# Patient Record
Sex: MALE | Race: AMERICAN INDIAN OR ALASKA NATIVE | URBAN - METROPOLITAN AREA
[De-identification: names, ages, dates, MRNs, and addresses within clinical notes are randomized per-mention and may not be internally consistent; named-entity substitution may affect disease eponyms.]

---
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
## 2024-07-02 PROBLEM — Z00.00 ENCOUNTER FOR PREVENTIVE HEALTH EXAMINATION: Status: ACTIVE | Noted: 2024-07-02

## 2024-09-22 ENCOUNTER — EMERGENCY (EMERGENCY)
Facility: HOSPITAL | Age: 24
LOS: 0 days | Discharge: HOME | End: 2024-09-22
Attending: STUDENT IN AN ORGANIZED HEALTH CARE EDUCATION/TRAINING PROGRAM
Payer: COMMERCIAL

## 2024-09-22 VITALS
HEIGHT: 68 IN | DIASTOLIC BLOOD PRESSURE: 94 MMHG | WEIGHT: 232.37 LBS | RESPIRATION RATE: 18 BRPM | OXYGEN SATURATION: 99 % | TEMPERATURE: 98 F | HEART RATE: 106 BPM | SYSTOLIC BLOOD PRESSURE: 158 MMHG

## 2024-09-22 DIAGNOSIS — G51.0 BELL'S PALSY: ICD-10-CM

## 2024-09-22 DIAGNOSIS — J45.909 UNSPECIFIED ASTHMA, UNCOMPLICATED: ICD-10-CM

## 2024-09-22 PROCEDURE — 99283 EMERGENCY DEPT VISIT LOW MDM: CPT | Mod: 25

## 2024-09-22 PROCEDURE — 93010 ELECTROCARDIOGRAM REPORT: CPT

## 2024-09-22 PROCEDURE — 93005 ELECTROCARDIOGRAM TRACING: CPT

## 2024-09-22 PROCEDURE — 99284 EMERGENCY DEPT VISIT MOD MDM: CPT

## 2024-09-22 RX ORDER — PREDNISONE 10 MG
3 TABLET, DOSE PACK ORAL
Qty: 21 | Refills: 0
Start: 2024-09-22 | End: 2024-09-28

## 2024-09-22 RX ORDER — VALACYCLOVIR 500 MG/1
1 TABLET, FILM COATED ORAL
Qty: 21 | Refills: 0
Start: 2024-09-22 | End: 2024-09-28

## 2024-09-22 NOTE — ED PROVIDER NOTE - PATIENT PORTAL LINK FT
You can access the FollowMyHealth Patient Portal offered by [facility] by registering at the following website: [email or web address]. By joining [employer]’s FollowMyHealth portal, you will also be able to view your health information using other applications (apps) compatible with our system.

## 2024-09-22 NOTE — ED ADULT TRIAGE NOTE - CHIEF COMPLAINT QUOTE
Pt reports right sided facial droop and slurred speech starting yesterday around 2pm with associated chest pain. Hx of asthma

## 2024-09-22 NOTE — ED PROVIDER NOTE - OBJECTIVE STATEMENT
22 yo M with hx of asthma who presents with R facial numbness since yesterday. Pt unable to fully close R eye or mouth. Yesterday had cp but none currently. No fever, headache, blurry vision, slurred speech, unilateral weakness, difficulty walking/swallowing, sob, nausea, vomiting, leg swelling/pain, hx of clots, hormone use, recent immobilization/surg, malignancy, hemoptysis.    No PMD

## 2024-09-22 NOTE — ED PROVIDER NOTE - NSFOLLOWUPINSTRUCTIONS_ED_ALL_ED_FT
Cas[place] Palsy    WHAT YOU NEED TO KNOW:    What is Bell palsy? Bell palsy is a sudden weakness or paralysis of one side of your face. Bell palsy occurs when the nerve that controls the muscles in your face becomes swollen or irritated.     What causes Bell palsy? It is not known for sure what causes the facial nerve swelling that leads to Bell palsy. The herpes simplex virus may cause nerve swelling. Viruses that cause colds and the flu may increase your risk for Bell palsy. Your risk is also higher if you are pregnant or have diabetes.    What are the signs and symptoms of Bell palsy? You may notice pain around an ear 1 or 2 days before you have other symptoms. A few days later, weakness may appear on the same side of your face as the ear pain. The symptoms often increase over the following several days. Bell palsy usually lasts about 2 to 3 weeks, but it can last several months. You may have any of the following:   •Weakness or paralysis on one side of your face  •Drooping of one side of your face  •Trouble closing your eye on the affected side of your face  •More sensitive hearing on the affected side  •Numbness or pain in your ear, tongue, or face  •A decrease in the amount of tears and saliva you make  •No ability to taste with the front part of your tongue  •Problems chewing food     How is Bell palsy diagnosed? Your healthcare provider will test how well you can move different parts of your face. He may also do an electromyography (EMG). This test measures the electrical activity of your muscles. An EMG also tests the nerves that control muscles.     How is Bell palsy treated? Bell palsy often goes away without treatment. Some treatments may help you get better faster or help prevent other problems caused by Bell palsy.   •Eye care may be needed if you have problems blinking or closing your eye. Use artificial tears during the day to keep your eye moist. Your healthcare provider may ask that you use an eye ointment at night. You may also need to wear a patch over your eye and tape it shut while you sleep. This helps to keep your eye from getting dry and infected. Wear sunglasses to protect your eye from direct sunlight. Stay away from places that have fumes, dust, or other particles in the air that may harm your eye.    •Medicine may be given to decrease swelling and irritation of your facial nerve. You may receive antiviral medicine if your healthcare provider thinks a virus caused your Bell palsy. Your healthcare provider may also suggest acetaminophen or ibuprofen to reduce pain. These medicines are available without a doctor's order. Ask which medicine to take, and how much you need. Follow directions. Acetaminophen can cause liver damage, and ibuprofen can cause stomach bleeding or kidney damage.     •Physical therapy may be recommended by your healthcare provider. A physical therapist can teach you how to massage your face and exercise the nerves and muscles in your face. This may help you get better sooner and prevent long-term problems. You can exercise on your own when your facial movement begins to return. Open and close your eye, wink, and smile wide. Do the exercises for 15 or 20 minutes several times a day.    When should I contact my healthcare provider?   •You have a fever.  •Your eye becomes red, irritated, or painful.  •You have questions or concerns about your condition or care.    When should I seek immediate care or call 911?   •You develop weakness or numbness on one side of your body (other than your face).  •You have double vision, or you lose vision in your eye.  •You have trouble thinking clearly.    CARE AGREEMENT:    You have the right to help plan your care. Learn about your health condition and how it may be treated. Discuss treatment options with your healthcare providers to decide what care you want to receive. You always have the right to refuse treatment.

## 2024-09-22 NOTE — ED PROVIDER NOTE - PHYSICAL EXAMINATION
CONSTITUTIONAL: well developed, in no acute distress, speaking in full sentences, nontoxic appearing  SKIN: warm, dry, no rash  HEAD: normocephalic, atraumatic  EYES: PERRL at 3mm, EOMI, no conjunctival erythema, no spontaneous nystagmus, no provoked nystagmus  ENT: patent airway, moist mucous membranes, no tongue deviation  NECK: supple, no masses, full flexion/extension without pain  CV:  regular rate, regular rhythm, 2+ radial pulses bilaterally  RESP: no wheezes, no rales, no rhonchi, normal work of breathing  ABD: soft, no tenderness, nondistended, no rebound, no guarding  MSK: normal ROM, no cyanosis, no edema  NEURO: alert, oriented, R facial palsy including R forehead, sensation intact to light touch symmetrically, 5/5 motor strength in all extremities, no pronator drift, normal gait   PSYCH: cooperative, appropriate

## 2024-09-22 NOTE — ED PROVIDER NOTE - CLINICAL SUMMARY MEDICAL DECISION MAKING FREE TEXT BOX
Pt here with R facial paralysis which includes R forehead, c/w bell palsy. No other focal neuro deficits suggestive of central etiology such as stroke, ICH, brain mass. Pt also complaining of cp yesterday which has since resolved. Lungs clear - not likely asthma exac or pneumonia. Low suspicion for PE given no PE risk factors, no tachypnea, no hypoxemia. Ekg with sinus tach, no ischemic changes. Rx for valtrex and prednisone sent to pharmacy. Discussed with pt bell palsy care including artificial tears and care with eating. Given PMD f/u. Considered observation vs admission however pt is a good candidate for d/c home with outpatient f/u given that no life threatening pathology found in ED. Strict ED return precautions given. Pt verbalized understanding and was agreeable with plan.

## 2024-09-24 ENCOUNTER — TRANSCRIPTION ENCOUNTER (OUTPATIENT)
Age: 24
End: 2024-09-24

## 2024-09-24 NOTE — CHART NOTE - NSCHARTNOTEFT_GEN_A_CORE
[facility] MRN [id number] - left  9/23 - DK / Pt already has established care 9/24 - CHRIS     Specialty: PCP

## 2025-01-03 ENCOUNTER — APPOINTMENT (OUTPATIENT)
Dept: PULMONOLOGY | Facility: CLINIC | Age: 25
End: 2025-01-03